# Patient Record
Sex: MALE | Race: BLACK OR AFRICAN AMERICAN | NOT HISPANIC OR LATINO | URBAN - METROPOLITAN AREA
[De-identification: names, ages, dates, MRNs, and addresses within clinical notes are randomized per-mention and may not be internally consistent; named-entity substitution may affect disease eponyms.]

---

## 2017-06-26 PROBLEM — Z00.00 ENCOUNTER FOR PREVENTIVE HEALTH EXAMINATION: Status: ACTIVE | Noted: 2017-06-26

## 2017-09-07 ENCOUNTER — OUTPATIENT (OUTPATIENT)
Dept: OUTPATIENT SERVICES | Facility: HOSPITAL | Age: 62
LOS: 1 days | Discharge: HOME | End: 2017-09-07

## 2017-09-07 DIAGNOSIS — E07.9 DISORDER OF THYROID, UNSPECIFIED: ICD-10-CM

## 2017-09-07 DIAGNOSIS — E78.2 MIXED HYPERLIPIDEMIA: ICD-10-CM

## 2017-09-07 DIAGNOSIS — R53.81 OTHER MALAISE: ICD-10-CM

## 2017-09-07 DIAGNOSIS — M13.0 POLYARTHRITIS, UNSPECIFIED: ICD-10-CM

## 2021-01-28 ENCOUNTER — APPOINTMENT (OUTPATIENT)
Dept: OTOLARYNGOLOGY | Facility: CLINIC | Age: 66
End: 2021-01-28
Payer: COMMERCIAL

## 2021-01-28 VITALS
WEIGHT: 211 LBS | SYSTOLIC BLOOD PRESSURE: 140 MMHG | TEMPERATURE: 98 F | BODY MASS INDEX: 31.25 KG/M2 | DIASTOLIC BLOOD PRESSURE: 100 MMHG | HEIGHT: 69 IN

## 2021-01-28 DIAGNOSIS — Z72.89 OTHER PROBLEMS RELATED TO LIFESTYLE: ICD-10-CM

## 2021-01-28 DIAGNOSIS — F17.200 NICOTINE DEPENDENCE, UNSPECIFIED, UNCOMPLICATED: ICD-10-CM

## 2021-01-28 DIAGNOSIS — Z86.79 PERSONAL HISTORY OF OTHER DISEASES OF THE CIRCULATORY SYSTEM: ICD-10-CM

## 2021-01-28 PROCEDURE — 99072 ADDL SUPL MATRL&STAF TM PHE: CPT

## 2021-01-28 PROCEDURE — 31231 NASAL ENDOSCOPY DX: CPT

## 2021-01-28 PROCEDURE — 99204 OFFICE O/P NEW MOD 45 MIN: CPT | Mod: 25

## 2021-01-28 RX ORDER — LEVOTHYROXINE SODIUM 137 UG/1
TABLET ORAL
Refills: 0 | Status: ACTIVE | COMMUNITY

## 2021-01-28 RX ORDER — ASPIRIN 325 MG/1
TABLET, FILM COATED ORAL
Refills: 0 | Status: ACTIVE | COMMUNITY

## 2021-01-28 NOTE — HISTORY OF PRESENT ILLNESS
[de-identified] : 65M here for initial evaluation.\par \par For years, he c/o left sided nasal obstruction where he cannot pass enough air through the left side of his nose and it feels clogged. This is most apparent at night laying down flat.\par Various nasal sprays do not help. There are no allergies or hayfever sx, no h/o sinusitis.\par He also has loud nighttime snoring and has PSG scheduled tomorrow.\par \par CT Sinus 12/9/20 (I only have report, not images):\par -variable degrees pansinusitis, mostly involving bilateral ethmoids\par -bilateral osteomeatal obstruction\par -rightward septal deviation\par -turbinate hypertrophy\par \par ROS otherwise unremarkable.

## 2021-01-28 NOTE — CONSULT LETTER
[Dear  ___] : Dear  [unfilled], [Courtesy Letter:] : I had the pleasure of seeing your patient, [unfilled], in my office today. [Consult Closing:] : Thank you very much for allowing me to participate in the care of this patient.  If you have any questions, please do not hesitate to contact me. [Sincerely,] : Sincerely, [FreeTextEntry3] : Troy Koehler MD\par Department of Otolaryngology - Head and Neck Surgery\par [facility]

## 2021-01-28 NOTE — PHYSICAL EXAM
[de-identified] : well healed anterior neck incision [Nasal Endoscopy Performed] : nasal endoscopy was performed, see procedure section for findings [Midline] : trachea located in midline position [de-identified] : poor dentition [de-identified] : crowded posterior oropharynx, thick uvula [Normal] : no rashes

## 2021-01-28 NOTE — PROCEDURE
[FreeTextEntry3] : Nasal Endoscopy:\par rightward septal deviation\par moderately severe turbinate hypertrophy (left>>right)\par polypoid right middle meatus\par no mucopus

## 2021-01-28 NOTE — ASSESSMENT
[FreeTextEntry1] : 65M here for initial evaluation. For years, he c/o left sided nasal obstruction where he cannot pass enough air through the left side of his nose and it feels clogged. This is most apparent at night laying down flat. Various nasal sprays do not help. There are no allergies or hayfever sx and there is no h/o sinusitis. He also has loud nighttime snoring and has PSG scheduled tomorrow.\par CT sinus (I only have report, not images) mentions variable degrees of pansinusitis, mostly involving bilateral ethmoids, bilateral osteomeatal obstruction, rightward septal deviation and turbinate hypertrophy.\par On exam, nasal endoscopy shows rightward septal deviation, moderately severe inferior turbinate hypertrophy (left>>right) and a polypoid right middle meatus. The rest of the head and neck exam is unremarkable.\par Nasal obstruction due to both turbinate hypertrophy and septal deviation. There may also be an element of nasal polyps as well given CT report and endoscopy. For now, he will RTO with PSG report and CT images for review to formulate definitive plan. He nay benefit from SMR/turbinate reduction, at least.

## 2021-03-05 ENCOUNTER — APPOINTMENT (OUTPATIENT)
Dept: OTOLARYNGOLOGY | Facility: CLINIC | Age: 66
End: 2021-03-05
Payer: COMMERCIAL

## 2021-03-05 DIAGNOSIS — J34.2 DEVIATED NASAL SEPTUM: ICD-10-CM

## 2021-03-05 DIAGNOSIS — J34.3 HYPERTROPHY OF NASAL TURBINATES: ICD-10-CM

## 2021-03-05 DIAGNOSIS — R06.83 SNORING: ICD-10-CM

## 2021-03-05 DIAGNOSIS — J34.89 OTHER SPECIFIED DISORDERS OF NOSE AND NASAL SINUSES: ICD-10-CM

## 2021-03-05 PROCEDURE — 31231 NASAL ENDOSCOPY DX: CPT

## 2021-03-05 PROCEDURE — 99072 ADDL SUPL MATRL&STAF TM PHE: CPT

## 2021-03-05 PROCEDURE — 99214 OFFICE O/P EST MOD 30 MIN: CPT | Mod: 25

## 2021-03-05 NOTE — HISTORY OF PRESENT ILLNESS
[de-identified] : 66M here in followup.\par \par For years, he c/o left sided nasal obstruction where he cannot pass enough air through the left side of his nose and it feels clogged. This is most apparent at night laying down flat. There is also facial pressure, postnasal drip and congestion.\par Various nasal sprays do not help. There are no allergies or hayfever sx, no h/o sinusitis.\par He also has loud nighttime snoring and has PSG schedule for 3/15/21\par \par CT Sinus 12/9/20 (I reviewed images):\par -moderate pansinus mucosal thickening, mostly involving bilateral ethmoids\par -bilateral osteomeatal obstruction, polypoid change\par -rightward septal deviation and spurring\par -turbinate hypertrophy\par \par ROS otherwise unremarkable.

## 2021-03-05 NOTE — PHYSICAL EXAM
[de-identified] : well healed anterior neck incision [Nasal Endoscopy Performed] : nasal endoscopy was performed, see procedure section for findings [Midline] : trachea located in midline position [de-identified] : poor dentition [de-identified] : crowded posterior oropharynx, thick uvula [Normal] : no rashes

## 2021-03-05 NOTE — PROCEDURE
[FreeTextEntry3] : Nasal Endoscopy:\par rightward septal deviation involving posterior aspect of caudal septum\par moderately severe turbinate hypertrophy (left>>right)\par polypoid middle meati w mild middle meatal polyp burden and mucus\par no mucopus

## 2021-03-05 NOTE — CONSULT LETTER
[Dear  ___] : Dear  [unfilled], [Courtesy Letter:] : I had the pleasure of seeing your patient, [unfilled], in my office today. [Consult Closing:] : Thank you very much for allowing me to participate in the care of this patient.  If you have any questions, please do not hesitate to contact me. [Sincerely,] : Sincerely, [FreeTextEntry3] : Troy Koehler MD\par Department of Otolaryngology - Head and Neck Surgery\par [facility]

## 2021-03-05 NOTE — ASSESSMENT
[FreeTextEntry1] : 66M here in followup. For years, he c/o left > right sided nasal obstruction where he cannot pass enough air through the left side of his nose and it feels clogged. This is most apparent at night laying down flat. Various nasal sprays do not help. There are no allergies or hayfever sx and there is no h/o sinusitis. He also has loud nighttime snoring and has PSG scheduled 3/15/21.\par CT sinus shows moderate pansinus mucosal disease, mostly involving bilateral ethmoids, with bilateral osteomeatal obstruction, rightward septal deviation/spurring and turbinate hypertrophy.\par On exam, nasal endoscopy shows rightward septal deviation, moderately severe inferior turbinate hypertrophy (left>>right) and polyps in either middle meatus. The rest of the head and neck exam is unremarkable.\par Nasal obstruction due to both turbinate hypertrophy and septal deviation. There is also be an element of nasal polyps/sinusitis as well given images. At this point, to address nasal sx, I would recommend SMR/turbinate reduction and ESS to assure middle meatal patency. This was discussed at length and all questions answered. Plan for OR in the next 1-2 months.

## 2021-04-05 ENCOUNTER — APPOINTMENT (OUTPATIENT)
Dept: HEART AND VASCULAR | Facility: CLINIC | Age: 66
End: 2021-04-05

## 2021-04-08 ENCOUNTER — NON-APPOINTMENT (OUTPATIENT)
Age: 66
End: 2021-04-08

## 2021-04-08 ENCOUNTER — LABORATORY RESULT (OUTPATIENT)
Age: 66
End: 2021-04-08

## 2021-04-08 ENCOUNTER — APPOINTMENT (OUTPATIENT)
Dept: HEART AND VASCULAR | Facility: CLINIC | Age: 66
End: 2021-04-08
Payer: COMMERCIAL

## 2021-04-08 VITALS — DIASTOLIC BLOOD PRESSURE: 98 MMHG | SYSTOLIC BLOOD PRESSURE: 128 MMHG

## 2021-04-08 VITALS
DIASTOLIC BLOOD PRESSURE: 90 MMHG | WEIGHT: 205 LBS | HEART RATE: 81 BPM | SYSTOLIC BLOOD PRESSURE: 138 MMHG | TEMPERATURE: 97.8 F | HEIGHT: 69 IN | BODY MASS INDEX: 30.36 KG/M2

## 2021-04-08 DIAGNOSIS — R06.02 SHORTNESS OF BREATH: ICD-10-CM

## 2021-04-08 DIAGNOSIS — I10 ESSENTIAL (PRIMARY) HYPERTENSION: ICD-10-CM

## 2021-04-08 DIAGNOSIS — R07.9 CHEST PAIN, UNSPECIFIED: ICD-10-CM

## 2021-04-08 DIAGNOSIS — I77.9 DISORDER OF ARTERIES AND ARTERIOLES, UNSPECIFIED: ICD-10-CM

## 2021-04-08 DIAGNOSIS — Z01.818 ENCOUNTER FOR OTHER PREPROCEDURAL EXAMINATION: ICD-10-CM

## 2021-04-08 PROCEDURE — 99072 ADDL SUPL MATRL&STAF TM PHE: CPT

## 2021-04-08 PROCEDURE — 93000 ELECTROCARDIOGRAM COMPLETE: CPT | Mod: NC

## 2021-04-08 PROCEDURE — 36415 COLL VENOUS BLD VENIPUNCTURE: CPT

## 2021-04-08 PROCEDURE — 99205 OFFICE O/P NEW HI 60 MIN: CPT | Mod: 25

## 2021-04-08 NOTE — DISCUSSION/SUMMARY
[Patient] : the patient [FreeTextEntry1] : \par 65 y/o male with h/o htn, hypothyroid, obesity, family h/o early cvd, carotid disease who presents for initial evaluation and clearance for nasal surgery 4/21\par \par \par -ordered ekg - nsr, normal intervals, no st/t changes\par -ordered labs today\par -ordered CTA and Echo given cp/fatigue\par -will review sleep apnea report and prior carotid study\par -counseled on cvd risk factors, diet, exercise\par -increase norvasc to 10 mg qd\par -continue asa 81 mg qd\par -will plan to start statin once labs return\par -f/up 2 weeks for bp\par \par I have spent 60 minutes reviewing labs, records, tests and discussing htn and preop clearance

## 2021-04-08 NOTE — HISTORY OF PRESENT ILLNESS
[FreeTextEntry1] : \par 65 y/o male with h/o htn, hypothyroid, obesity, family h/o early cvd, carotid disease who presents for initial evaluation and clearance for nasal surgery \par \par notes some chest pain for past 6 months\par notes some sob with stairs\par no syncope, lh, edema, palpitations, orthopnea, pnd\par \par No h/o rf, dm, cva, chf, ihd\par \par Has been on norvasc around 10 years\par \par Had recent sleep study - results pending\par \par not actively exercising\par \par \par PMH/PSH:\par thyroidectomy \par hypothyroid\par htn\par left rotator cuff repair/tendon \par obesity\par \par \par ALL:\par nkda\par \par MEDS:\par asa 81 mg qd\par synthroid 200 mcg qd\par amlodipine 7.5 mg qd\par \par \par SH:\par former tobacco, quit 40 yrs ago, 3packs/week teens\par social etoh\par no drugs\par works as dispatcher for  company\par from [place]\par 7 kids - healthy\par single\par live with fiance\par \par \par FH:\par mother -  44 cirrhosis liver\par father -  56 MI\par 9 sibilngs (2  )\par  RLQ

## 2021-04-09 LAB
ALBUMIN SERPL ELPH-MCNC: 4.6 G/DL
ALP BLD-CCNC: 93 U/L
ALT SERPL-CCNC: 18 U/L
ANION GAP SERPL CALC-SCNC: 9 MMOL/L
APPEARANCE: CLEAR
APTT BLD: 34.8 SEC
AST SERPL-CCNC: 22 U/L
BACTERIA: NEGATIVE
BASOPHILS # BLD AUTO: 0.02 K/UL
BASOPHILS NFR BLD AUTO: 0.5 %
BILIRUB SERPL-MCNC: 0.5 MG/DL
BILIRUBIN URINE: NEGATIVE
BLOOD URINE: NEGATIVE
BUN SERPL-MCNC: 14 MG/DL
CALCIUM SERPL-MCNC: 9.7 MG/DL
CHLORIDE SERPL-SCNC: 104 MMOL/L
CHOLEST SERPL-MCNC: 224 MG/DL
CO2 SERPL-SCNC: 27 MMOL/L
COLOR: YELLOW
CREAT SERPL-MCNC: 1.36 MG/DL
CREAT SPEC-SCNC: 156 MG/DL
EOSINOPHIL # BLD AUTO: 0.09 K/UL
EOSINOPHIL NFR BLD AUTO: 2.1 %
ESTIMATED AVERAGE GLUCOSE: 120 MG/DL
GLUCOSE QUALITATIVE U: NEGATIVE
GLUCOSE SERPL-MCNC: 87 MG/DL
HBA1C MFR BLD HPLC: 5.8 %
HCT VFR BLD CALC: 42.4 %
HDLC SERPL-MCNC: 49 MG/DL
HGB BLD-MCNC: 12.4 G/DL
HYALINE CASTS: 0 /LPF
IMM GRANULOCYTES NFR BLD AUTO: 0.2 %
INR PPP: 0.99 RATIO
KETONES URINE: NEGATIVE
LDLC SERPL CALC-MCNC: 158 MG/DL
LEUKOCYTE ESTERASE URINE: NEGATIVE
LYMPHOCYTES # BLD AUTO: 1.52 K/UL
LYMPHOCYTES NFR BLD AUTO: 36.1 %
MAGNESIUM SERPL-MCNC: 2.1 MG/DL
MAN DIFF?: NORMAL
MCHC RBC-ENTMCNC: 22.4 PG
MCHC RBC-ENTMCNC: 29.2 GM/DL
MCV RBC AUTO: 76.7 FL
MICROALBUMIN 24H UR DL<=1MG/L-MCNC: <1.2 MG/DL
MICROALBUMIN/CREAT 24H UR-RTO: NORMAL MG/G
MICROSCOPIC-UA: NORMAL
MONOCYTES # BLD AUTO: 0.26 K/UL
MONOCYTES NFR BLD AUTO: 6.2 %
NEUTROPHILS # BLD AUTO: 2.31 K/UL
NEUTROPHILS NFR BLD AUTO: 54.9 %
NITRITE URINE: NEGATIVE
NONHDLC SERPL-MCNC: 175 MG/DL
PH URINE: 5.5
PLATELET # BLD AUTO: 254 K/UL
POTASSIUM SERPL-SCNC: 4.3 MMOL/L
PROT SERPL-MCNC: 7.7 G/DL
PROTEIN URINE: NEGATIVE
PT BLD: 11.7 SEC
RBC # BLD: 5.53 M/UL
RBC # FLD: 14.8 %
RED BLOOD CELLS URINE: 1 /HPF
SODIUM SERPL-SCNC: 139 MMOL/L
SPECIFIC GRAVITY URINE: 1.02
SQUAMOUS EPITHELIAL CELLS: 0 /HPF
TRIGL SERPL-MCNC: 84 MG/DL
TSH SERPL-ACNC: 23.7 UIU/ML
UROBILINOGEN URINE: NORMAL
WBC # FLD AUTO: 4.21 K/UL
WHITE BLOOD CELLS URINE: 0 /HPF

## 2021-04-12 ENCOUNTER — APPOINTMENT (OUTPATIENT)
Dept: OTOLARYNGOLOGY | Facility: HOSPITAL | Age: 66
End: 2021-04-12

## 2021-04-15 ENCOUNTER — RESULT REVIEW (OUTPATIENT)
Age: 66
End: 2021-04-15

## 2021-04-15 ENCOUNTER — APPOINTMENT (OUTPATIENT)
Dept: CT IMAGING | Facility: CLINIC | Age: 66
End: 2021-04-15
Payer: COMMERCIAL

## 2021-04-15 ENCOUNTER — OUTPATIENT (OUTPATIENT)
Dept: OUTPATIENT SERVICES | Facility: HOSPITAL | Age: 66
LOS: 1 days | End: 2021-04-15

## 2021-04-15 PROCEDURE — 75574 CT ANGIO HRT W/3D IMAGE: CPT | Mod: 26

## 2021-04-20 ENCOUNTER — APPOINTMENT (OUTPATIENT)
Dept: OTOLARYNGOLOGY | Facility: CLINIC | Age: 66
End: 2021-04-20

## 2021-04-22 ENCOUNTER — APPOINTMENT (OUTPATIENT)
Dept: HEART AND VASCULAR | Facility: CLINIC | Age: 66
End: 2021-04-22
Payer: COMMERCIAL

## 2021-04-22 VITALS
DIASTOLIC BLOOD PRESSURE: 90 MMHG | OXYGEN SATURATION: 98 % | WEIGHT: 205 LBS | BODY MASS INDEX: 30.36 KG/M2 | TEMPERATURE: 98.2 F | HEIGHT: 69 IN | HEART RATE: 77 BPM | SYSTOLIC BLOOD PRESSURE: 130 MMHG

## 2021-04-22 DIAGNOSIS — I25.10 ATHEROSCLEROTIC HEART DISEASE OF NATIVE CORONARY ARTERY W/OUT ANGINA PECTORIS: ICD-10-CM

## 2021-04-22 PROCEDURE — 99214 OFFICE O/P EST MOD 30 MIN: CPT

## 2021-04-22 PROCEDURE — 99072 ADDL SUPL MATRL&STAF TM PHE: CPT

## 2021-04-22 RX ORDER — AMLODIPINE BESYLATE AND BENAZEPRIL HYDROCHLORIDE 10; 40 MG/1; MG/1
10-40 CAPSULE ORAL
Qty: 30 | Refills: 3 | Status: ACTIVE | COMMUNITY
Start: 2021-04-15 | End: 1900-01-01

## 2021-04-22 RX ORDER — ATORVASTATIN CALCIUM 40 MG/1
40 TABLET, FILM COATED ORAL
Qty: 30 | Refills: 5 | Status: DISCONTINUED | COMMUNITY
Start: 2021-04-16 | End: 2021-04-22

## 2021-04-22 RX ORDER — AMLODIPINE BESYLATE 10 MG/1
10 TABLET ORAL
Qty: 30 | Refills: 6 | Status: DISCONTINUED | COMMUNITY
Start: 2021-04-08 | End: 2021-04-22

## 2021-04-22 NOTE — HISTORY OF PRESENT ILLNESS
[FreeTextEntry1] : 67 y/o male with h/o htn, hypothyroid, obesity, family h/o early cvd, carotid disease, cad, predm, hl who presents for f/up and clearance for nasal surgery \par \par \par last seen  - sent for CTA, Echo \par \par \par -CTA : mild dilatation desc thoracic ao 3.1 cm with mild noncalcified mural plaque, cardiomegaly, calcium score 48.5, 68%, LM minimal, prox/mid LAD minimal, D1 minimal, \par \par noted cp/sob previously\par no  syncope, lh, edema, palpitations, orthopnea, pnd\par \par No h/o rf, dm, cva, chf, ihd\par \par Has been on norvasc around 10 years\par \par Had recent sleep study - results pending\par \par not actively exercising\par \par \par PMH/PSH:\par predm\par thyroidectomy \par hypothyroid\par htn\par left rotator cuff repair/tendon \par obesity\par cad\par hl\par \par ALL:\par nkda\par \par MEDS:\par asa 81 mg qd\par synthroid 225 mcg qd\par amlodipine/benazepril 10/20 mg qd\par lipitor 20 mg qhs\par \par \par SH:\par former tobacco, quit 40 yrs ago, 3packs/week teens\par social etoh\par no drugs\par works as dispatcher for  company\par from [place]\par 7 kids - healthy\par single\par live with fiance\par \par \par FH:\par mother -  44 cirrhosis liver\par father -  56 MI\par 9 sibilngs (2  )\par \par

## 2021-04-22 NOTE — DISCUSSION/SUMMARY
[Patient] : the patient [___ Week(s)] : [unfilled] week(s) [FreeTextEntry1] : 65 y/o male with h/o htn, hypothyroid, obesity, family h/o early cvd, cad, carotid disease, predm, hl who presents for f/up and clearance for nasal surgery\par \par \par -ekg 4/21 - nsr, normal intervals, no st/t changes\par -labs 2021 reviewed\par -CTA 4/21: mild dilatation desc thoracic ao 3.1 cm with mild noncalcified mural plaque, cardiomegaly, calcium score 48.5, 68%, LM minimal, prox/mid LAD minimal, D1 minimal\par -Echo pending \par -will review sleep apnea report and prior carotid study\par -counseled on cvd risk factors, diet, exercise\par -continue norvasc/benazperil but increase to 10/40 mg qd\par -continue asa 81 mg qd\par -increase to lipitor 40 mg qhs\par -endocrine referral for thyroid and predm\par -f/up 3 weeks htn, cad\par \par I have spent 30 minutes reviewing labs, records, tests and discussing htn, cad and preop clearance. \par

## 2021-05-11 ENCOUNTER — FORM ENCOUNTER (OUTPATIENT)
Age: 66
End: 2021-05-11

## 2021-05-12 ENCOUNTER — OUTPATIENT (OUTPATIENT)
Dept: OUTPATIENT SERVICES | Facility: HOSPITAL | Age: 66
LOS: 1 days | End: 2021-05-12
Payer: COMMERCIAL

## 2021-05-12 DIAGNOSIS — R07.9 CHEST PAIN, UNSPECIFIED: ICD-10-CM

## 2021-05-12 PROCEDURE — 93306 TTE W/DOPPLER COMPLETE: CPT | Mod: 26

## 2021-05-12 PROCEDURE — 93306 TTE W/DOPPLER COMPLETE: CPT

## 2021-05-17 ENCOUNTER — APPOINTMENT (OUTPATIENT)
Dept: HEART AND VASCULAR | Facility: CLINIC | Age: 66
End: 2021-05-17

## 2021-05-20 ENCOUNTER — APPOINTMENT (OUTPATIENT)
Dept: HEART AND VASCULAR | Facility: CLINIC | Age: 66
End: 2021-05-20
Payer: COMMERCIAL

## 2021-05-20 VITALS — TEMPERATURE: 98 F

## 2021-05-20 VITALS
HEART RATE: 80 BPM | BODY MASS INDEX: 29.77 KG/M2 | SYSTOLIC BLOOD PRESSURE: 110 MMHG | DIASTOLIC BLOOD PRESSURE: 68 MMHG | OXYGEN SATURATION: 97 % | HEIGHT: 69 IN | WEIGHT: 201 LBS

## 2021-05-20 PROCEDURE — 99214 OFFICE O/P EST MOD 30 MIN: CPT

## 2021-05-20 RX ORDER — ROSUVASTATIN CALCIUM 10 MG/1
10 TABLET, FILM COATED ORAL
Qty: 30 | Refills: 3 | Status: ACTIVE | COMMUNITY
Start: 2021-05-20 | End: 1900-01-01

## 2021-05-20 RX ORDER — ATORVASTATIN CALCIUM 40 MG/1
40 TABLET, FILM COATED ORAL
Qty: 30 | Refills: 5 | Status: DISCONTINUED | COMMUNITY
Start: 2021-04-22 | End: 2021-05-20

## 2021-05-20 NOTE — DISCUSSION/SUMMARY
[Patient] : the patient [___ Month(s)] : in [unfilled] month(s) [FreeTextEntry1] : 65 y/o male with h/o htn, hypothyroid, obesity, family h/o early cvd, cad, carotid disease, predm, hl who presents for f/up and clearance for nasal surgery\par \par -ekg 4/21 - nsr, normal intervals, no st/t changes\par -labs 2021 reviewed\par -CTA 4/21: mild dilatation desc thoracic ao 3.1 cm with mild noncalcified mural plaque, cardiomegaly, calcium score 48.5, 68%, LM minimal, prox/mid LAD minimal, D1 minimal\par -Echo 5/21: mild conclvh, ef 60-65%, no wma, trace mr/tr/pr\par -Carotid 12/20: mild right intimal thickening, mild left intimal thickening, no hemodynamically significant arterial disease in ICA bilaterally\par -started cpap for sleep apnea\par -counseled on cvd risk factors, diet, exercise\par -continue norvasc/benazperil \par -continue asa 81 mg qd\par -d/c lipitor, start crestor 10 mg qhs\par -endocrine referral for thyroid and predm\par -f/up 2 months for lipid panel\par \par I have spent 30 minutes reviewing labs, records, tests and discussing htn, cad and preop clearance. \par  \par Overall he is low risk for a low risk procedure and can proceed to the OR without further testing. \par

## 2021-05-20 NOTE — PHYSICAL EXAM
[Normal] : well developed, well nourished, no acute distress [Well Developed] : well developed [Well Nourished] : well nourished [No Acute Distress] : no acute distress

## 2021-05-20 NOTE — HISTORY OF PRESENT ILLNESS
[FreeTextEntry1] : 65 y/o male with h/o htn, hypothyroid, obesity, family h/o early cvd, jennifer, carotid disease, cad, predm, hl who presents for f/up today and preop for nasal surgery\par \par \par last seen \par increased norvasc/benazepril and lipitor\par referred to endo for thyroid, predm - has appt to see next month\par notes new arm muscle pain/joint pain felt could be 2/2 lipitor\par \par -Echo : mild conclvh, ef 60-65%, no wma, trace mr/tr/pr\par -Carotid : mild right intimal thickening, mild left intimal thickening, no hemodynamically significant arterial disease in ICA bilaterally\par \par -CTA : mild dilatation desc thoracic ao 3.1 cm with mild noncalcified mural plaque, cardiomegaly, calcium score 48.5, 68%, LM minimal, prox/mid LAD minimal, D1 minimal, \par \par \par no sp, sob, syncope, lh, edema, palpitations, orthopnea, pnd\par \par No h/o rf, dm, cva, chf, ihd\par \par Has been on norvasc around 10 years\par \par Had recent sleep study - showed sleep apnea, started cpap last month\par \par not actively exercising\par \par \par PMH/PSH:\par jennifer\par predm\par thyroidectomy \par hypothyroid\par htn\par left rotator cuff repair/tendon 2016\par obesity\par cad\par hl\par \par ALL:\par nkda\par \par MEDS:\par asa 81 mg qd\par synthroid 225 mcg qd\par amlodipine/benazepril 10/40 mg qd\par lipitor 40 mg qhs\par \par \par SH:\par former tobacco, quit 40 yrs ago, 3packs/week teens\par social etoh\par no drugs\par works as dispatcher for  company\par from [place]\par 7 kids - healthy\par single\par live with fiance\par \par \par FH:\par mother -  44 cirrhosis liver\par father -  56 MI\par 9 sibilngs (2  )\par

## 2021-06-23 ENCOUNTER — APPOINTMENT (OUTPATIENT)
Dept: ENDOCRINOLOGY | Facility: CLINIC | Age: 66
End: 2021-06-23

## 2021-07-22 ENCOUNTER — APPOINTMENT (OUTPATIENT)
Dept: HEART AND VASCULAR | Facility: CLINIC | Age: 66
End: 2021-07-22

## 2024-01-21 ENCOUNTER — NON-APPOINTMENT (OUTPATIENT)
Age: 69
End: 2024-01-21